# Patient Record
Sex: MALE | Race: WHITE | HISPANIC OR LATINO | Employment: FULL TIME | URBAN - METROPOLITAN AREA
[De-identification: names, ages, dates, MRNs, and addresses within clinical notes are randomized per-mention and may not be internally consistent; named-entity substitution may affect disease eponyms.]

---
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
## 2018-04-30 ENCOUNTER — APPOINTMENT (OUTPATIENT)
Dept: RADIOLOGY | Facility: MEDICAL CENTER | Age: 18
End: 2018-04-30
Attending: EMERGENCY MEDICINE
Payer: COMMERCIAL

## 2018-04-30 ENCOUNTER — HOSPITAL ENCOUNTER (EMERGENCY)
Facility: MEDICAL CENTER | Age: 18
End: 2018-04-30
Attending: EMERGENCY MEDICINE
Payer: COMMERCIAL

## 2018-04-30 ENCOUNTER — NON-PROVIDER VISIT (OUTPATIENT)
Dept: OCCUPATIONAL MEDICINE | Facility: CLINIC | Age: 18
End: 2018-04-30
Payer: COMMERCIAL

## 2018-04-30 VITALS
SYSTOLIC BLOOD PRESSURE: 140 MMHG | OXYGEN SATURATION: 99 % | TEMPERATURE: 97.3 F | DIASTOLIC BLOOD PRESSURE: 65 MMHG | HEIGHT: 66 IN | WEIGHT: 186.51 LBS | HEART RATE: 81 BPM | BODY MASS INDEX: 29.97 KG/M2 | RESPIRATION RATE: 18 BRPM

## 2018-04-30 DIAGNOSIS — Z02.83 ENCOUNTER FOR DRUG SCREENING: ICD-10-CM

## 2018-04-30 DIAGNOSIS — Z02.1 PRE-EMPLOYMENT DRUG SCREENING: ICD-10-CM

## 2018-04-30 DIAGNOSIS — T14.8XXA PUNCTURE WOUND: ICD-10-CM

## 2018-04-30 LAB
AMP AMPHETAMINE: NORMAL
COC COCAINE: NORMAL
INT CON NEG: NEGATIVE
INT CON POS: POSITIVE
MET METHAMPHETAMINES: NORMAL
OPI OPIATES: NORMAL
PCP PHENCYCLIDINE: NORMAL
POC DRUG COMMENT 753798-OCCUPATIONAL HEALTH: NORMAL
THC: NORMAL

## 2018-04-30 PROCEDURE — 99026 IN-HOSPITAL ON CALL SERVICE: CPT | Performed by: INTERNAL MEDICINE

## 2018-04-30 PROCEDURE — 80305 DRUG TEST PRSMV DIR OPT OBS: CPT | Performed by: INTERNAL MEDICINE

## 2018-04-30 PROCEDURE — 11730 AVULSION NAIL PLATE SIMPLE 1: CPT

## 2018-04-30 PROCEDURE — 90715 TDAP VACCINE 7 YRS/> IM: CPT | Performed by: EMERGENCY MEDICINE

## 2018-04-30 PROCEDURE — 90471 IMMUNIZATION ADMIN: CPT

## 2018-04-30 PROCEDURE — 700111 HCHG RX REV CODE 636 W/ 250 OVERRIDE (IP): Performed by: EMERGENCY MEDICINE

## 2018-04-30 PROCEDURE — 96365 THER/PROPH/DIAG IV INF INIT: CPT

## 2018-04-30 PROCEDURE — 99284 EMERGENCY DEPT VISIT MOD MDM: CPT

## 2018-04-30 PROCEDURE — 73130 X-RAY EXAM OF HAND: CPT | Mod: RT

## 2018-04-30 PROCEDURE — 700101 HCHG RX REV CODE 250: Performed by: EMERGENCY MEDICINE

## 2018-04-30 RX ORDER — CEPHALEXIN 500 MG/1
500 CAPSULE ORAL 4 TIMES DAILY
Qty: 28 CAP | Refills: 0 | Status: SHIPPED | OUTPATIENT
Start: 2018-04-30 | End: 2019-07-17

## 2018-04-30 RX ORDER — BUPIVACAINE HYDROCHLORIDE 5 MG/ML
10 INJECTION, SOLUTION EPIDURAL; INTRACAUDAL ONCE
Status: COMPLETED | OUTPATIENT
Start: 2018-04-30 | End: 2018-04-30

## 2018-04-30 RX ORDER — CEFAZOLIN SODIUM 2 G/100ML
2 INJECTION, SOLUTION INTRAVENOUS ONCE
Status: COMPLETED | OUTPATIENT
Start: 2018-04-30 | End: 2018-04-30

## 2018-04-30 RX ADMIN — CLOSTRIDIUM TETANI TOXOID ANTIGEN (FORMALDEHYDE INACTIVATED), CORYNEBACTERIUM DIPHTHERIAE TOXOID ANTIGEN (FORMALDEHYDE INACTIVATED), BORDETELLA PERTUSSIS TOXOID ANTIGEN (GLUTARALDEHYDE INACTIVATED), BORDETELLA PERTUSSIS FILAMENTOUS HEMAGGLUTININ ANTIGEN (FORMALDEHYDE INACTIVATED), BORDETELLA PERTUSSIS PERTACTIN ANTIGEN, AND BORDETELLA PERTUSSIS FIMBRIAE 2/3 ANTIGEN 0.5 ML: 5; 2; 2.5; 5; 3; 5 INJECTION, SUSPENSION INTRAMUSCULAR at 09:49

## 2018-04-30 RX ADMIN — CEFAZOLIN SODIUM 2 G: 2 INJECTION, SOLUTION INTRAVENOUS at 11:32

## 2018-04-30 RX ADMIN — BUPIVACAINE HYDROCHLORIDE 10 ML: 5 INJECTION, SOLUTION EPIDURAL; INTRACAUDAL; PERINEURAL at 10:39

## 2018-04-30 ASSESSMENT — PAIN SCALES - GENERAL
PAINLEVEL_OUTOF10: 2
PAINLEVEL_OUTOF10: 7

## 2018-04-30 NOTE — ED PROVIDER NOTES
"ED Provider Note    CHIEF COMPLAINT  Chief Complaint   Patient presents with   • Hand Pain     nail through palm       HPI  Thien Dumont is a 17 y.o. male who presents for evaluation of hand pain. The patient was at work today using a nail gun when he shot a nail through his nondominant right hand. He has no numbness. There is no bleeding.    REVIEW OF SYSTEMS  See HPI for further details. All other systems are negative.     PAST MEDICAL HISTORY  History reviewed. No pertinent past medical history.    FAMILY HISTORY  Family History   Problem Relation Age of Onset   • No Known Problems Mother    • No Known Problems Father        SOCIAL HISTORY  Social History     Social History   • Marital status: Single     Spouse name: N/A   • Number of children: N/A   • Years of education: N/A     Social History Main Topics   • Smoking status: Never Smoker   • Smokeless tobacco: Never Used   • Alcohol use No   • Drug use: No   • Sexual activity: Yes     Partners: Female     Other Topics Concern   • Not on file     Social History Narrative   • No narrative on file       SURGICAL HISTORY  Past Surgical History:   Procedure Laterality Date   • ORCHIECTOMY  11/30/2017    Procedure: Scrotal Exploration, Left Orchiectomy,  Right Testicular Fixation;  Surgeon: Arnol Irwin M.D.;  Location: SURGERY [facility];  Service: Urology       CURRENT MEDICATIONS  Home Medications     Reviewed by Jocelyne Hernandez R.N. (Registered Nurse) on 04/30/18 at 0943  Med List Status: Complete   Medication Last Dose Status        Patient Rupert Taking any Medications                       ALLERGIES  No Known Allergies    PHYSICAL EXAM  VITAL SIGNS: /65   Pulse 85   Temp 36.3 °C (97.4 °F) (Temporal)   Resp 16   Ht 1.676 m (5' 6\")   Wt 84.6 kg (186 lb 8.2 oz)   SpO2 100%   BMI 30.10 kg/m²     Constitutional: Well developed, Well nourished, No acute distress, Non-toxic appearance.   HENT: Normocephalic, Atraumatic. "   Cardiovascular: Normal heart rate.   Thorax & Lungs: No respiratory distress.  Skin: Warm, Dry.   Musculoskeletal: There is a nail that enters the palm of the right hand between the 4th and 5th digits and extends through completely and at 6 between the 4th and 5th digits. Sensation is intact to light touch throughout. Motor is intact to all digits. There is no active bleeding.  Neurologic: Awake alert.    RADIOLOGY/PROCEDURES  DX-HAND 3+ RIGHT   Final Result      Metallic foreign body consistent with a nail which extends either immediately adjacent to or through the base of the fifth metacarpal.              Procedure note    Indication: Nail in the right hand  Bupivacaine 1/2% is used for an ulnar nerve block. Upon reevaluation the patient did not received complete anesthesia and therefore local anesthesia is supplemented. This results in good anesthesia. I reviewed the x-rays and attempted to back out the nail and has a cane out and appeared to have a metal wire tank on it. At that point I cut the head off the nail and advanced it through the hand and pulled it out on the other side intact. Wound was cleaned and a dressing applied. The patient is up-to-date on his tetanus and he is given 2 g of Ancef IV.    COURSE & MEDICAL DECISION MAKING  Pertinent Labs & Imaging studies reviewed. (See chart for details)  Is a 17-year-old here for evaluation of a hand injury. While at work today he was using a nail gun and shot himself in his right hand. This resulted in a nail entering on the palmar surface between the 4th and 5th metacarpals and exiting on the dorsal service between the 4th and 5th metacarpals. He appeared to be neurovascularly intact. The foreign body is removed per the procedure note above. This was an on-the-job injury and he will follow-up at [facility] tomorrow. We'll provide him a prescription for Keflex. I have filled out the C4 form and the patient will be off work until released by  occupational health. He is given a discharge instruction sheet on puncture wounds.    FINAL IMPRESSION  1. Puncture wound to the right hand  2. Construction nail foreign body in the right hand  3. Foreign body removal  4. Patient is updated on his tetanus         Electronically signed by: Drew Yancey, 4/30/2018 9:50 AM

## 2018-04-30 NOTE — ED TRIAGE NOTES
Ambulatory to room.  Nail through palmar surface, protruding through dorsal side of hand.  From nail gun.  Majority of glove cut off.  Tetanus not current.  Chart placed for ERP eval.

## 2018-04-30 NOTE — LETTER
"  FORM C-4:  EMPLOYEE’S CLAIM FOR COMPENSATION/ REPORT OF INITIAL TREATMENT  EMPLOYEE’S CLAIM - PROVIDE ALL INFORMATION REQUESTED   First Name  Thien Last Name  Hilario Dumont Birthdate             Age  2000 17 y.o. Sex  male Claim Number   Home Employee Address  [address]                                     Zip  [address] Height  1.676 m (5' 6\") (10 %, Z= -1.27, Source: CDC 2-20 Years) Weight  84.6 kg (186 lb 8.2 oz) (86 %, Z= 1.08, Source: CDC 2-20 Years) 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 (if applicable)  Moving pallets    How did this injury or occupational disease occur? Be specific and answer in detail. Use additional sheet if necessary)  I was moving pallets. My nail gun was to the right of me, when I moved my hand the gun went off and the nail shot through my right hand.   If you believe that you have an occupational disease, when did you first have knowledge of the disability and it relationship to your employment?  n/a Witnesses to the Accident  Keo     Nature of Injury or Occupational Disease  Workers' Compensation  Part(s) of Body Injured or Affected  Hand (R), N/A, N/A  "   I certify that the above is true and correct to the best of my knowledge and that I have provided this information in order to obtain the benefits of [place] or [place] of [place]).  I hereby authorize any physician, chiropractor, surgeon, practitioner, or other person, any hospital, including [facility] or [facility], any medical service organization, any insurance company, or other institution or organization to release to each other, any medical or other information, including benefits paid or payable, pertinent to this injury or disease, except information relative to diagnosis, treatment and/or counseling for AIDS, psychological conditions, alcohol or controlled substances, for which I must give specific authorization.  A Photostat of this authorization shall be as valid as the original.   Date 04/30/2018 Place  [facility] Employee’s Signature   THIS REPORT MUST BE COMPLETED AND MAILED WITHIN 3 WORKING DAYS OF TREATMENT   Place  [facility], EMERGENCY DEPT  Name of Facility   [facility]   Date  4/30/2018 Diagnosis  (T14.8XXA) Puncture wound Is there evidence the injured employee was under the influence of alcohol and/or another controlled substance at the time of accident?   Hour  11:33 AM Description of Injury or Disease  Puncture wound No   Treatment  Foreign body removal from right hand  Have you advised the patient to remain off work five days or more?         No   X-Ray Findings  Positive  Comments:nail in hand   If Yes   From Date    To Date      From information given by the employee, together with medical evidence, can you directly connect this injury or occupational disease as job incurred?  Yes If No, is the employee capable of: Full Duty  No Modified Duty  No   Is additional medical care by a physician indicated?  Yes  Comments:[facility] [employer] tomorrow  "If Modified Duty, Specify any Limitations / Restrictions        Do you know of any previous injury or disease contributing to this condition or occupational disease?  No   Date  4/30/2018 Print Doctor’s Name  Nichole Yancey I certify the employer’s copy of this form was mailed on:   Address  [address] [address] [phone] Insurer’s Use Only   [facility]  [address]    Provider’s Tax ID Number  [id number] Telephone  Dept: [phone]    Doctor’s Signature  e-SignDAVISNICHOLE M.D. Degree   MD    Original - TREATING PHYSICIAN OR CHIROPRACTOR   Pg 2-Insurer/TPA   Pg 3-Employer   Pg 4-Employee                                                                                                  Form C-4 (rev01/03)     BRIEF DESCRIPTION OF RIGHTS AND BENEFITS  (Pursuant to [place]RS 616C.050)    Notice of Injury or Occupational Disease (Incident Report Form C-1): If an injury or occupational disease (OD) arises out of and in the course of employment, you must provide written notice to your employer as soon as practicable, but no later than 7 days after the accident or OD. Your employer shall maintain a sufficient supply of the required forms.  Claim for Compensation (Form C-4): If medical treatment is sought, the form C-4 is available at the place of initial treatment. A completed \"Claim for Compensation\" (Form C-4) must be filed within 90 days after an accident or OD. The treating physician or chiropractor must, within 3 working days after treatment, complete and mail to the employer, the employer's insurer and third-party , the Claim for Compensation.  Medical Treatment: If you require medical treatment for your on-the-job injury or OD, you may be required to select a physician or chiropractor from a list provided by your workers’ compensation insurer, if it has contracted with an Organization for Managed Care (MCO) or Preferred Provider Organization (PPO) or providers of " health care. If your employer has not entered into a contract with an MCO or PPO, you may select a physician or chiropractor from the Panel of Physicians and Chiropractors. Any medical costs related to your industrial injury or OD will be paid by your insurer.  Temporary Total Disability (TTD): If your doctor has certified that you are unable to work for a period of at least 5 consecutive days, or 5 cumulative days in a 20-day period, or places restrictions on you that your employer does not accommodate, you may be entitled to TTD compensation.  Temporary Partial Disability (TPD): If the wage you receive upon reemployment is less than the compensation for TTD to which you are entitled, the insurer may be required to pay you TPD compensation to make up the difference. TPD can only be paid for a maximum of 24 months.  Permanent Partial Disability (PPD): When your medical condition is stable and there is an indication of a PPD as a result of your injury or OD, within 30 days, your insurer must arrange for an evaluation by a rating physician or chiropractor to determine the degree of your PPD. The amount of your PPD award depends on the date of injury, the results of the PPD evaluation and your age and wage.  Permanent Total Disability (PTD): If you are medically certified by a treating physician or chiropractor as permanently and totally disabled and have been granted a PTD status by your insurer, you are entitled to receive monthly benefits not to exceed 66 2/3% of your average monthly wage. The amount of your PTD payments is subject to reduction if you previously received a PPD award.  Vocational Rehabilitation Services: You may be eligible for vocational rehabilitation services if you are unable to return to the job due to a permanent physical impairment or permanent restrictions as a result of your injury or occupational disease.  Transportation and Per Jazmine Reimbursement: You may be eligible for travel expenses  and per rajiv associated with medical treatment.  Reopening: You may be able to reopen your claim if your condition worsens after claim closure.  Appeal Process: If you disagree with a written determination issued by the insurer or the insurer does not respond to your request, you may appeal to the Department of Administration, , by following the instructions contained in your determination letter. You must appeal the determination within 70 days from the date of the determination letter at [address], [address], [place], [place] [address], or [address], [address], [place], [place] [address]. If you disagree with the  decision, you may appeal to the Department of Administration, . You must file your appeal within 30 days from the date of the  decision letter at [address], [address], [place], [place] [address], or [address], [address], [place], [place] [address]. If you disagree with a decision of an , you may file a petition for judicial review with the District Court. You must do so within 30 days of the Appeal Officer’s decision. You may be represented by an  at your own expense or you may contact the [facility] for possible representation.  [place]  for Injured Workers (NAIW): If you disagree with a  decision, you may request that NAIW represent you without charge at an  Hearing. For information regarding denial of benefits, you may contact the [facility] at: [address], [address], [place] [address], [phone], or [address], [address], [place] [address], [phone]  To File a Complaint with the Division: If you wish to file a complaint with the  of the Division of Industrial Relations (DIR), please contact the Workers’ Compensation Section, [address], [address], [place], [place] [address], telephone [phone]  [phone], or [address], [address], [place], [place] [address], telephone [phone].  For assistance with Workers’ Compensation Issues: you may contact the Office of the Governor Consumer Health Assistance, [address] [place] [address], [address], [place], [place] [address], Toll Free 1-750.657.7659, Web site: http://govcha.[facility].nv., E-mail [email or web address].                                                                                                                                                                               __________________________________________________________________                                    _________________            Employee Name / Signature                                                                                                                            Date                                       D-2 (rev. 10/07)

## 2018-04-30 NOTE — DISCHARGE INSTRUCTIONS
Cuerpo extraño  (Foreign Body)  Tiene un cuerpo extraño. [place] significa que en golden organismo hay algo que no debería estar. El ingreso de mitzy elemento originó shen herida punzante. Shen herida punzante es la que atraviesa todas las capas de la piel y del tejido que está por debajo de la piel. Dayan tipo de heridas se infecta fácilmente. [place] ocurre cuando las bacterias (gérmenes) se introducen bajo la piel. Los clavos oxidados y cuerpos extraños similares generalmente están sucios y transportan gérmenes.   TRATAMIENTO  · Generalmente el cuerpo extraño se extirpa cuando puede retirarse con facilidad luego de que el problema ocurre.   · Algunas veces se carter en el lugar y se extirpan en shen cirugia posterior. Pueden dejarse indefinidamente si no ocasionan trastornos más tarde.   · A continuación se dan algunas instrucciones generales para el cuidado de golden herida.   INSTRUCCIONES PARA EL CUIDADO DOMICILIARIO  · Podrán colocarle un vendaje, según la localización de la herida. Éste podrá cambiarse shen vez por día o según se le indique. Si el vendaje se adhiere, podrá remojarse con agua jabonosa o peróxido de hidrógeno.   · Utilice los medicamentos de venta zak o de prescripción para el dolor, el malestar o la fiebre, según se lo indique el profesional que lo asiste.   · Esté atento de que golden organismo elimine el cuerpo extraño. Es decir, el cuerpo extraño puede salirse sólo de la herida. [place] es normal.   · Posiblemente le recomienden realizar un seguimiento con golden médico o con un especialista. Es muy importante llamar para concertar las citas de seguimiento con el objeto de evitar infecciones u otras complicaciones.   SOLICITE ATENCIÓN MÉDICA DE INMEDIATO SI:  · Presenta enrojecimiento, hinchazón o aumento del dolor en la herida.   · Advierte un olor fétido que proviene de la herida o del vendaje.   · Aparece pus en la herida.   · La temperatura oral se eleva sin motivo por encima de 102° F (38.9° C) o según le indique  "el profesional que lo asiste.   · Aumento del dolor en la milan de la herida.   Si usted no ha recibido la vacuna contra el tétanos o no recuerda cuándo recibió la última, asegúrese de controlarse con el profesional que lo asiste para determinar si la necesita. Generalmente para shen herida \"sucia\" deberá aplicarse shen dosis de refuerzo si no se la ha colocado en los últimos raciel años. Generalmente para shen herida \"limpia\" deberá aplicarse shen dosis de refuerzo si no se la ha colocado en los últimos margot años.  Si tiene un cuerpo extraño que debe ser extirpado y no le realizaron la intervención en el día de hoy, asegúrese de tener loy cuándo se le harán los controles de seguimiento y cuál es el plan de acción a seguir para la curación. Es golden responsabilidad realizar el seguimiento de uday problema.  ESTÉ SEGURO QUE:   · Comprende las instrucciones para el jose médica.   · Controlará golden enfermedad.   · Solicitará atención médica de inmediato según las indicaciones.   Document Released: 09/27/2006 Document Revised: 03/11/2013  [employer]® Patient Information ©2013 [employer].  "

## 2018-04-30 NOTE — ED NOTES
Work comp paperwork complete.  All lines and monitors D/Cd.  Hands washed and wounds covered in non adherent dressing.  Bleeding stopped, soft tissue swelling noted.  Discharge instructions given, questions answered.  Left ER on foot escorted by RN. Pt states all belongings in possession.  Rx x1 and occupational health f/u info given.

## 2019-02-14 NOTE — OR NURSING
Dr Irwin at bedside; explained surgery to patient and family.  Used family  for explanation of risks and benefits.  
Pt feeling much better and diaphoresis gone, will try ambulating again before discharging patient.  Pt able to take crackers and fluids without further nausea.  
Pt sat up to try to walk and test readiness for going home and pt became diaphoretic, dizzy with nausea but did not have emesis.  
Pt still feels dizzy but vitals are stable and he remains talkative with his mother and aunt.  Pt denies any pain and showing no physical signs of it.  
Pt took x 2 apple juices for a total of 240cc.  Pt exhibits no nausea.  
Pt treated with ondansetron, more IV fluid, mars crackers and tea and pt tolerating without nausea.  
Pt walked to bathroom and promptly vomited 400cc in toilet.  Pt then urinated approximately 500cc in toilet.   
patient is able to climb 2 flights of stairs, gym daily, and heavy house work

## 2019-07-17 ENCOUNTER — HOSPITAL ENCOUNTER (EMERGENCY)
Facility: MEDICAL CENTER | Age: 19
End: 2019-07-17
Attending: EMERGENCY MEDICINE
Payer: COMMERCIAL

## 2019-07-17 VITALS
SYSTOLIC BLOOD PRESSURE: 128 MMHG | DIASTOLIC BLOOD PRESSURE: 76 MMHG | HEART RATE: 74 BPM | BODY MASS INDEX: 33.02 KG/M2 | TEMPERATURE: 98.9 F | RESPIRATION RATE: 14 BRPM | WEIGHT: 204.59 LBS | OXYGEN SATURATION: 98 %

## 2019-07-17 DIAGNOSIS — L60.0 INGROWN TOENAIL OF RIGHT FOOT: ICD-10-CM

## 2019-07-17 LAB — GLUCOSE BLD-MCNC: 98 MG/DL (ref 65–99)

## 2019-07-17 PROCEDURE — 90715 TDAP VACCINE 7 YRS/> IM: CPT | Performed by: EMERGENCY MEDICINE

## 2019-07-17 PROCEDURE — A9270 NON-COVERED ITEM OR SERVICE: HCPCS | Performed by: EMERGENCY MEDICINE

## 2019-07-17 PROCEDURE — 99283 EMERGENCY DEPT VISIT LOW MDM: CPT

## 2019-07-17 PROCEDURE — 700102 HCHG RX REV CODE 250 W/ 637 OVERRIDE(OP): Performed by: EMERGENCY MEDICINE

## 2019-07-17 PROCEDURE — 700101 HCHG RX REV CODE 250: Performed by: EMERGENCY MEDICINE

## 2019-07-17 PROCEDURE — 82962 GLUCOSE BLOOD TEST: CPT

## 2019-07-17 PROCEDURE — 90471 IMMUNIZATION ADMIN: CPT

## 2019-07-17 PROCEDURE — 700111 HCHG RX REV CODE 636 W/ 250 OVERRIDE (IP): Performed by: EMERGENCY MEDICINE

## 2019-07-17 PROCEDURE — 11765 WEDGE EXCISION SKN NAIL FOLD: CPT

## 2019-07-17 RX ORDER — BUPIVACAINE HYDROCHLORIDE 2.5 MG/ML
10 INJECTION, SOLUTION EPIDURAL; INFILTRATION; INTRACAUDAL ONCE
Status: COMPLETED | OUTPATIENT
Start: 2019-07-17 | End: 2019-07-17

## 2019-07-17 RX ORDER — CEPHALEXIN 500 MG/1
500 CAPSULE ORAL 4 TIMES DAILY
Qty: 28 CAP | Refills: 0 | Status: SHIPPED | OUTPATIENT
Start: 2019-07-17 | End: 2019-07-24

## 2019-07-17 RX ORDER — LIDOCAINE HYDROCHLORIDE 10 MG/ML
10 INJECTION, SOLUTION INFILTRATION; PERINEURAL ONCE
Status: COMPLETED | OUTPATIENT
Start: 2019-07-17 | End: 2019-07-17

## 2019-07-17 RX ORDER — CEPHALEXIN 500 MG/1
500 CAPSULE ORAL ONCE
Status: COMPLETED | OUTPATIENT
Start: 2019-07-17 | End: 2019-07-17

## 2019-07-17 RX ORDER — IBUPROFEN 600 MG/1
600 TABLET ORAL EVERY 8 HOURS PRN
Qty: 20 TAB | Refills: 0 | Status: SHIPPED | OUTPATIENT
Start: 2019-07-17

## 2019-07-17 RX ADMIN — CLOSTRIDIUM TETANI TOXOID ANTIGEN (FORMALDEHYDE INACTIVATED), CORYNEBACTERIUM DIPHTHERIAE TOXOID ANTIGEN (FORMALDEHYDE INACTIVATED), BORDETELLA PERTUSSIS TOXOID ANTIGEN (GLUTARALDEHYDE INACTIVATED), BORDETELLA PERTUSSIS FILAMENTOUS HEMAGGLUTININ ANTIGEN (FORMALDEHYDE INACTIVATED), BORDETELLA PERTUSSIS PERTACTIN ANTIGEN, AND BORDETELLA PERTUSSIS FIMBRIAE 2/3 ANTIGEN 0.5 ML: 5; 2; 2.5; 5; 3; 5 INJECTION, SUSPENSION INTRAMUSCULAR at 19:22

## 2019-07-17 RX ADMIN — LIDOCAINE HYDROCHLORIDE 10 ML: 10 INJECTION, SOLUTION INFILTRATION; PERINEURAL at 19:15

## 2019-07-17 RX ADMIN — CEPHALEXIN 500 MG: 500 CAPSULE ORAL at 20:10

## 2019-07-17 RX ADMIN — BUPIVACAINE HYDROCHLORIDE 10 ML: 2.5 INJECTION, SOLUTION EPIDURAL; INFILTRATION; INTRACAUDAL; PERINEURAL at 19:15

## 2019-07-18 NOTE — ED PROVIDER NOTES
ED PROVIDER NOTE     Scribed for Joey Diaz M.D. by Baljinder Pierre. 7/17/2019, 6:48 PM.    CHIEF COMPLAINT  Chief Complaint   Patient presents with   • Toe Pain     HPI    Primary care provider: PENNIE Womack  Means of Arrival: walk in  History obtained by: patient, girlfriend  Limitations: none     Thien Dumont is a 18 y.o. male who presents with one month of right great toe pain. He works in construction, on his feet for multiple hours a day and occasionally has achy pain to his medial great toe and it sometimes bleeds. Denies fever, chills, vomiting, nausea, chest pain or SOB.  He has not attempted any soaks.  No direct blow injuries.  No prior episodes before the last month.  Takes Advil occasionally with moderate alleviation.  Pain is only worsened by palpation and being on his feet for a long time at work.  No fevers or numbness or other associated symptoms.    REVIEW OF SYSTEMS  Constitutional: Negative for fever or chills.   HENT: Negative for nosebleeds or sore throat.    Respiratory: Negative for cough or shortness of breath.    Cardiovascular: Negative for chest pain or palpitations.   Gastrointestinal: Negative for nausea, vomiting, abdominal pain.   Musculoskeletal: Positive for right great toe pain. Negative for back pain.  Skin: Positive for redness to his right great toe.  Neurological: Negative for sensory or motor changes.       PAST FAMILY HISTORY  Family History   Problem Relation Age of Onset   • No Known Problems Mother    • No Known Problems Father        SOCIAL HISTORY  Social History     Social History Main Topics   • Smoking status: Never Smoker   • Smokeless tobacco: Never Used   • Alcohol use No   • Drug use: No   • Sexual activity: Yes     Partners: Female       SURGICAL HISTORY   has a past surgical history that includes orchiectomy (11/30/2017).    CURRENT MEDICATIONS  No daily medications    ALLERGIES  No Known Allergies    PHYSICAL EXAM  VITAL SIGNS: BP  119/82   Pulse 94   Temp 36.4 °C (97.6 °F) (Oral)   Resp 16   Wt 92.8 kg (204 lb 9.4 oz)   SpO2 98%   BMI 33.02 kg/m²    Pulse ox interpretation: On room air, I interpret this pulse ox as normal.  Constitutional: Well-developed, well-nourished, sitting up.  HENT: Head is atraumatic. Mucous membranes moist.   Eyes: Conjunctivae are normal. EOMI.   Respiratory: No respiratory distress, wheezes, or rhonchi.    Cardiovascular: RRR, no m/r/g.  Abdomen: Soft, nontender.  Musculoskeletal: Right great toe has mild erythema and dried blood with tenderness to palpation medial to the nailbed, obvious ingrown toenail, no pus under the nailbed noted.  Neurological: Alert. No focal weakness or asymmetry.  Normal strength and sensation peer  Skin: Mild erythema to the medial great toe, capillary refill less than 2 seconds.  Psych: Appropriate mood. Normal affect.       PROCEDURES    Ingrown Toenail Procedure Note: Informed consent is obtained verbally. Using 0.25% Bupivacaine and 1% lidocaine, digital block block was done (2 cc total) of the right big toe. Using sterile instruments, I freed the nail from the nail bed bilaterally and removed both sides of the nail including the ingrown portion to the level of the nail skin fold.  EBL less than 1 cc. Antibiotic ointment and a dressing are applied. Remove the dressing tomorrow.  The patient tolerated well without complication.      COURSE & MEDICAL DECISION MAKING    This is a 18 y.o. male who presents with intermittent right great toe pain.  Ingrown toenail on examination.  Occasionally bleeds per    Differential Diagnosis includes but is not limited to:  Ingrown toe nail, paronychia, foreign body, cellulitis     ED Course:  6:54 PM - Patient seen and examined at bedside. Discussed plan of care, including ingrown toenail removal procedure. Patient agrees to the plan of care.  No direct blow injury highly doubt fracture dislocation I do not feel x-ray would  at  this time.  He has no fevers, there is only mild erythema it does not appear cellulitic but mostly irritated from chronic ingrown nail.    7:34 PM partial toenail procedure completed, see procedure note.  Patient will be discharged at this time with prescriptions of Motrin 600 MG and Keflex 500 MG.  Follow-up with primary care provider for recheck and routine health care, return to the ER for any worsening pain or swelling or fevers or any other new or worsening symptoms.         Medications   bupivacaine 0.25% (SENSORCAINE-MARCAINE) pf injection 10 mL (10 mL Injection Given 7/17/19 1915)   lidocaine (XYLOCAINE) 1%  injection (10 mL Infiltration Given 7/17/19 1915)   tetanus-dipth-acell pertussis (ADACEL) inj 0.5 mL (0.5 mL Intramuscular Given 7/17/19 1922)   cephALEXin (KEFLEX) capsule 500 mg (500 mg Oral Given 7/17/19 2010)     FINAL IMPRESSION  1. Ingrown toenail of right foot        PRESCRIPTIONS  Discharge Medication List as of 7/17/2019  8:12 PM      START taking these medications    Details   cephALEXin (KEFLEX) 500 MG Cap Take 1 Cap by mouth 4 times a day for 7 days., Disp-28 Cap, R-0, Print Rx Paper      ibuprofen (MOTRIN) 600 MG Tab Take 1 Tab by mouth every 8 hours as needed for Moderate Pain or Inflammation., Disp-20 Tab, R-0, Print Rx Paper             FOLLOW UP  PENNIE Womack  [address] [address] [phone]    Schedule an appointment as soon as possible for a visit in 2 days  for recheck and routine health care    [facility], Emergency Dept  [facility] [address] [phone]  Today  If you have ANY new or worse symptoms!      -DISCHARGE-     I personally reviewed and verified the patient's nursing notes, as well as past medical, surgical, and social history.     IBaljinder (Scribe), am scribing for, and in the presence of, Joey Diaz M.D..    Electronically signed by: Baljinder Pierre (Riteshibluma), 7/17/2019    JD  Joey Diaz M.D. personally performed the services described in this documentation, as scribed by Baljinder Pierre in my presence, and it is both accurate and complete.    Portions of this record were made with voice recognition software.  Despite my review, spelling/grammar/context errors may still remain. Interpretation of this chart should be taken in this context.    The note accurately reflects work and decisions made by me.  Joey Diaz  7/17/2019  10:35 PM

## 2019-07-18 NOTE — ED NOTES
Pt discharged to home.  Pt given discharge paperwork and all applicable prescriptions written by provider.  Pt to follow up with PCP if indicated in discharge instructions.  Pt verbalized understanding of discharge teaching and will return to ED if condition worsens.

## 2019-07-18 NOTE — DISCHARGE INSTRUCTIONS
You were seen and evaluated in the Emergency Department at [facility] for:     Toenail pain and skin redness    You received the following medications:    Local anesthetic injection    You received the following prescriptions:    Ibuprofen and cephalexin  ----------------------------    Please make sure to follow up with:    Your primary care provider for recheck and routine health care, but if you have any worsening pain or redness or swelling or fevers or any other new or worsening symptoms please return to the ER immediately.    Good luck, we hope you get better soon!  ----------------------------    We always encourage patients to return IMMEDIATELY if they have:  Increased or changing pain, passing out, fevers over 100.4 (taken in your mouth or rectally) for more than 2 days, redness or swelling of skin or tissues, feeling like your heart is beating fast, chest pain that is new or worsening, trouble breathing, feeling like your throat is closing up and can not breath, inability to walk, weakness of any part of your body, new dizziness, severe bleeding that won't stop from any part of your body, if you can't eat or drink, or if you have any other concerns.   If you feel worse, please know that you can always return with any questions, concerns, worse symptoms, or you are feeling unsafe. We certainly cannot say for sure that we have ruled out every illness or dangerous disease, but we feel that at this specific time, your exam, tests, and vital signs like heart rate and blood pressure are safe for discharge.

## 2020-07-11 ENCOUNTER — HOSPITAL ENCOUNTER (EMERGENCY)
Facility: MEDICAL CENTER | Age: 20
End: 2020-07-12
Attending: EMERGENCY MEDICINE
Payer: COMMERCIAL

## 2020-07-11 ENCOUNTER — APPOINTMENT (OUTPATIENT)
Dept: RADIOLOGY | Facility: MEDICAL CENTER | Age: 20
End: 2020-07-11
Attending: EMERGENCY MEDICINE
Payer: COMMERCIAL

## 2020-07-11 DIAGNOSIS — S40.812A ABRASION OF LEFT UPPER EXTREMITY, INITIAL ENCOUNTER: ICD-10-CM
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
## 2020-07-11 DIAGNOSIS — S80.812A ABRASION OF LEFT LOWER EXTREMITY, INITIAL ENCOUNTER: ICD-10-CM

## 2020-07-11 PROCEDURE — 99284 EMERGENCY DEPT VISIT MOD MDM: CPT

## 2020-07-11 PROCEDURE — 305948 HCHG GREEN TRAUMA ACT PRE-NOTIFY NO CC

## 2020-07-11 RX ORDER — ACETAMINOPHEN 325 MG/1
650 TABLET ORAL ONCE
Status: COMPLETED | OUTPATIENT
Start: 2020-07-12 | End: 2020-07-12

## 2020-07-12 ENCOUNTER — HOSPITAL ENCOUNTER (OUTPATIENT)
Dept: RADIOLOGY | Facility: MEDICAL CENTER | Age: 20
End: 2020-07-12
Payer: COMMERCIAL

## 2020-07-12 VITALS
OXYGEN SATURATION: 96 % | TEMPERATURE: 98.2 F | BODY MASS INDEX: 29.62 KG/M2 | RESPIRATION RATE: 18 BRPM | WEIGHT: 200 LBS | HEIGHT: 69 IN | SYSTOLIC BLOOD PRESSURE: 150 MMHG | HEART RATE: 99 BPM | DIASTOLIC BLOOD PRESSURE: 77 MMHG

## 2020-07-12 PROCEDURE — 700102 HCHG RX REV CODE 250 W/ 637 OVERRIDE(OP): Performed by: EMERGENCY MEDICINE

## 2020-07-12 PROCEDURE — 700111 HCHG RX REV CODE 636 W/ 250 OVERRIDE (IP): Performed by: EMERGENCY MEDICINE

## 2020-07-12 PROCEDURE — 73060 X-RAY EXAM OF HUMERUS: CPT | Mod: LT

## 2020-07-12 PROCEDURE — 71101 X-RAY EXAM UNILAT RIBS/CHEST: CPT | Mod: LT

## 2020-07-12 PROCEDURE — A9270 NON-COVERED ITEM OR SERVICE: HCPCS | Performed by: EMERGENCY MEDICINE

## 2020-07-12 PROCEDURE — 73560 X-RAY EXAM OF KNEE 1 OR 2: CPT | Mod: LT

## 2020-07-12 PROCEDURE — 90715 TDAP VACCINE 7 YRS/> IM: CPT | Performed by: EMERGENCY MEDICINE

## 2020-07-12 PROCEDURE — 90471 IMMUNIZATION ADMIN: CPT

## 2020-07-12 RX ADMIN — ACETAMINOPHEN 650 MG: 325 TABLET, FILM COATED ORAL at 00:26

## 2020-07-12 RX ADMIN — CLOSTRIDIUM TETANI TOXOID ANTIGEN (FORMALDEHYDE INACTIVATED), CORYNEBACTERIUM DIPHTHERIAE TOXOID ANTIGEN (FORMALDEHYDE INACTIVATED), BORDETELLA PERTUSSIS TOXOID ANTIGEN (GLUTARALDEHYDE INACTIVATED), BORDETELLA PERTUSSIS FILAMENTOUS HEMAGGLUTININ ANTIGEN (FORMALDEHYDE INACTIVATED), BORDETELLA PERTUSSIS PERTACTIN ANTIGEN, AND BORDETELLA PERTUSSIS FIMBRIAE 2/3 ANTIGEN 0.5 ML: 5; 2; 2.5; 5; 3; 5 INJECTION, SUSPENSION INTRAMUSCULAR at 00:26
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
## 2020-07-12 NOTE — ED NOTES
Patient discharged home per ERP.  Discharge teaching and education discussed with patient. POC discussed.   Patient verbalized understanding of discharge teaching and education. No other questions at this time.     VSS. Patient alert and oriented. Patient to go up to l and d with wife. Able to ambulate off unit safely with steady gait.

## (undated) DEVICE — ELECTRODE 850 FOAM ADHESIVE - HYDROGEL RADIOTRNSPRNT (50/PK)

## (undated) DEVICE — SODIUM CHL IRRIGATION 0.9% 1000ML (12EA/CA)

## (undated) DEVICE — BOVIE NEEDLE TIP 3CM COLORADO

## (undated) DEVICE — NEPTUNE 4 PORT MANIFOLD - (20/PK)

## (undated) DEVICE — GOWN SURGEONS X-LARGE - DISP. (30/CA)

## (undated) DEVICE — SUTURE GENERAL

## (undated) DEVICE — SUTURE 2-0 SILK 12 X 18" (36PK/BX)"

## (undated) DEVICE — NEEDLE NON SAFETY 25 GA X 1 1/2 IN HYPO (100EA/BX)

## (undated) DEVICE — SLEEVE, VASO, THIGH, MED

## (undated) DEVICE — SUTURE 3-0 PROLENE SH 30 (36PK/BX)"

## (undated) DEVICE — BLANKET WARMING UPPER BODY - (10/CA)

## (undated) DEVICE — SENSOR SPO2 NEO LNCS ADHESIVE (20/BX) SEE USER NOTES

## (undated) DEVICE — CANISTER SUCTION 3000ML MECHANICAL FILTER AUTO SHUTOFF MEDI-VAC NONSTERILE LF DISP  (40EA/CA)

## (undated) DEVICE — SUCTION INSTRUMENT YANKAUER BULBOUS TIP W/O VENT (50EA/CA)

## (undated) DEVICE — TUBE E-T HI-LO CUFF 7.0MM (10EA/PK)

## (undated) DEVICE — Device

## (undated) DEVICE — TUBING CLEARLINK DUO-VENT - C-FLO (48EA/CA)

## (undated) DEVICE — KIT ROOM DECONTAMINATION

## (undated) DEVICE — BLADE SURGICAL #15 - (50/BX 3BX/CA)

## (undated) DEVICE — SUTURE 2-0 VICRYL PLUS SH - 27 INCH (36/BX)

## (undated) DEVICE — GLOVE BIOGEL PI ORTHO SZ 6 1/2 SURGICAL PF LF (40PR/BX)

## (undated) DEVICE — DRAIN PENROSE 3/8 IN X 12 IN - STERILE (25EA/BX)

## (undated) DEVICE — SUTURE 3-0 CHROMIC GUT FS-2 27 (36PK/BX)"

## (undated) DEVICE — ELECTRODE DUAL RETURN W/ CORD - (50/PK)

## (undated) DEVICE — TRAY SRGPRP PVP IOD WT PRP - (20/CA)

## (undated) DEVICE — GAUZE FLUFF STERILE 2-PLY 36 X 36 (100EA/CA)

## (undated) DEVICE — SET EXTENSION WITH 2 PORTS (48EA/CA) ***PART #2C8610 IS A SUBSTITUTE*****

## (undated) DEVICE — MASK ANESTHESIA ADULT  - (100/CA)

## (undated) DEVICE — GLOVE BIOGEL SZ 7.5 SURGICAL PF LTX - (50PR/BX 4BX/CA)

## (undated) DEVICE — SUTURE 4-0 MONOCRYL PLUS PS-1 - 27 INCH (36/BX)

## (undated) DEVICE — DRAPE LAPAROTOMY T SHEET - (12EA/CA)

## (undated) DEVICE — HEAD HOLDER JUNIOR/ADULT

## (undated) DEVICE — LACTATED RINGERS INJ 1000 ML - (14EA/CA 60CA/PF)

## (undated) DEVICE — GOWN SURGICAL XX-LARGE - (28EA/CA) SIRUS NON REINFORCED

## (undated) DEVICE — KIT ANESTHESIA W/CIRCUIT & 3/LT BAG W/FILTER (20EA/CA)

## (undated) DEVICE — PACK MINOR BASIN - (2EA/CA)

## (undated) DEVICE — PROTECTOR ULNA NERVE - (36PR/CA)

## (undated) DEVICE — SET LEADWIRE 5 LEAD BEDSIDE DISPOSABLE ECG (1SET OF 5/EA)

## (undated) DEVICE — SUTURE 0 SILK CT-1 (36PK/BX)

## (undated) DEVICE — GLOVE BIOGEL PI INDICATOR SZ 7.0 SURGICAL PF LF - (50/BX 4BX/CA)